# Patient Record
Sex: FEMALE | Race: WHITE | NOT HISPANIC OR LATINO | Employment: FULL TIME | URBAN - METROPOLITAN AREA
[De-identification: names, ages, dates, MRNs, and addresses within clinical notes are randomized per-mention and may not be internally consistent; named-entity substitution may affect disease eponyms.]

---

## 2022-08-12 ENCOUNTER — TELEPHONE (OUTPATIENT)
Dept: NEUROLOGY | Facility: CLINIC | Age: 47
End: 2022-08-12

## 2022-08-12 NOTE — TELEPHONE ENCOUNTER
Called and spoke with the patient to remind of her upcoming appt with Dr Ericka Klein  Provided the address as she was unsure  Patient also stated that she has not seen neurology prior and she has not had imaging done  However her PCP tried to order a CT scan because she can not get a MRI because she has titanium in her back  The CT scan was denied as she would need to see neurology  She is concerned of possible stroke as her mother had a silent stroke which was only found because of imaging that was taken because of headaches and she was in her 45s when this occurred

## 2022-08-26 ENCOUNTER — CONSULT (OUTPATIENT)
Dept: NEUROLOGY | Facility: CLINIC | Age: 47
End: 2022-08-26
Payer: COMMERCIAL

## 2022-08-26 VITALS
HEIGHT: 65 IN | WEIGHT: 128.3 LBS | BODY MASS INDEX: 21.38 KG/M2 | TEMPERATURE: 97.6 F | DIASTOLIC BLOOD PRESSURE: 91 MMHG | HEART RATE: 104 BPM | SYSTOLIC BLOOD PRESSURE: 174 MMHG

## 2022-08-26 DIAGNOSIS — G43.109 OPHTHALMIC MIGRAINE: Primary | ICD-10-CM

## 2022-08-26 DIAGNOSIS — G43.109 OCULAR MIGRAINE: ICD-10-CM

## 2022-08-26 DIAGNOSIS — Z82.3 FAMILY HISTORY OF STROKE: ICD-10-CM

## 2022-08-26 DIAGNOSIS — Z85.820 HISTORY OF MALIGNANT MELANOMA OF SKIN: ICD-10-CM

## 2022-08-26 PROBLEM — M79.18 MYOFASCIAL PAIN: Status: ACTIVE | Noted: 2018-07-10

## 2022-08-26 PROBLEM — M51.26 LUMBAR DISCOGENIC PAIN SYNDROME: Status: ACTIVE | Noted: 2018-07-10

## 2022-08-26 PROBLEM — M47.816 LUMBAR FACET ARTHROPATHY: Status: ACTIVE | Noted: 2018-07-10

## 2022-08-26 PROBLEM — M47.817 LUMBOSACRAL SPONDYLOSIS WITHOUT MYELOPATHY: Status: ACTIVE | Noted: 2018-07-10

## 2022-08-26 PROCEDURE — 99204 OFFICE O/P NEW MOD 45 MIN: CPT | Performed by: PSYCHIATRY & NEUROLOGY

## 2022-08-26 RX ORDER — ACETAMINOPHEN AND CODEINE PHOSPHATE 300; 30 MG/1; MG/1
TABLET ORAL
COMMUNITY

## 2022-08-26 RX ORDER — DEXTROAMPHETAMINE SACCHARATE, AMPHETAMINE ASPARTATE, DEXTROAMPHETAMINE SULFATE AND AMPHETAMINE SULFATE 5; 5; 5; 5 MG/1; MG/1; MG/1; MG/1
20 TABLET ORAL
COMMUNITY

## 2022-08-26 NOTE — PROGRESS NOTES
Patient ID: Bria Connors is a 55 y o  female who presents to the [address] [place] [address]    Assessment/Plan:   Patient Instructions   Ophthalmic migraines:  Maria De Jesus Morrison presents for an initial consultation with regard to episodes of altered vision along with some peripheral visual phenomena potentially consistent with ophthalmic migraines  She does have some light sensitivity and nausea associated with these episodes however she does have a significant difference in refraction from 1 eye to the other and this may in part be related to some changes in vision as opposed to migraine  She does have a family history of stroke in the young which places her at increased risk compared to the general population  She also has a remote history of melanoma of the skin which has been removed     -to more fully rule out any significant intracranial or intra-ocular pathology including inflammation we will request an MRI of the brain and orbits with and without contrast   -if she would like to trial a supplement to try and reduce the frequency and severity of these episodes we would recommend looking for a combination that has 1 or more of the following ingredients (magnesium, riboflavin/ vitamin B2, coenzyme Q10, feverfew)  She should use it according to package instructions and watch for any changes in her GI function  - it is important that she remain well hydrated drinking 48-64 oz of non caffeinated beverages per day in addition to any caffeine     - when she does have episodes and particularly in the evening I would like for her to do a trial of covering each eye for a while to determine if it is really more affecting 1 eye than the other end of that actually resolved her symptoms    I would also like for her to do a trial in the evening of increasing the light in the room during an episode to determine if that makes her feel worse which may be more consistent with migraine or makes her feel better which may be more consistent with a visual focus issue  We will be in touch with her with regard to the results of her MRI soon as they are available  She should contact us in no more than 1 or 2 weeks to let us know how she is doing with regard to the testing we recommended at home and also if she chooses to take the supplement  She will return to the office to see me directly in 4 months time  Diagnoses and all orders for this visit:    Ophthalmic migraine  -     Ambulatory Referral to Neurology  -     MRI brain and orbits wo and w contrast; Future  -     BUN; Future  -     Creatinine, serum; Future  -     BUN  -     Creatinine, serum    Family history of stroke  -     MRI brain and orbits wo and w contrast; Future  -     BUN; Future  -     Creatinine, serum; Future  -     BUN  -     Creatinine, serum    History of malignant melanoma of skin  -     MRI brain and orbits wo and w contrast; Future  -     BUN; Future  -     Creatinine, serum; Future  -     BUN  -     Creatinine, serum    Ocular migraine  -     MRI brain and orbits wo and w contrast; Future  -     BUN; Future  -     Creatinine, serum; Future  -     BUN  -     Creatinine, serum    Other orders  -     acetaminophen-codeine (TYLENOL with CODEINE #3) 300-30 MG per tablet; Take by mouth  -     Fluticasone Propionate (FLONASE NA); into each nostril  -     amphetamine-dextroamphetamine (ADDERALL) 20 mg tablet; Take 20 mg by mouth 2 (two) times a day        Subjective:  I agree with the documentation by Trevor Merino MS3 with my additions/edits and confirmed key aspects of the history  Stacy De Jesus is a 55 y o  female with PMH of melanoma and noninvasive knee tumor presenting with worsening headaches       Headaches first appeared during the last 6 months of her pregnancy in 2011, after which they stopped until recurring last summer   The episodes are characterized by nausea, light sensitivity, and visual disturbance however the past year her headaches have had more predominant visual symptoms compared to those during pregnancy  Episodes occur daily and last a couple of hours with no apparent trigger or aura  She experiences nausea and sees "fireworks" in the periphery and some floaters in her central vision  She also has a difficult time "focusing in" on the image in front of her almost as if the picture is moving forward and backward (towards and away from her)  Advil does not help relieve symptoms, however laying down in a dark room helps  Symptoms worsen with bright lights or reading  Occasionally she experiences what she calls "typical headaches" with a band or tension like feeling across her forehead  The episodes of concern, however, are characterized less by pain and more by difficulty focusing in on what she looks at; she does not have trouble concentrating, it is strictly a visual difficulty  Occasionally she feels a pressure behind her eyes  Symptoms do not have any temporal pattern and are not precipitated by valsalva maneuver        Of note, patient went to her optometrist in August '21 and recieved prescription glasses, with difference in refraction between her eyes  The glasses help her when reading but she notices problems with depth perception when driving  She also noted a 19 pound weight loss since February but attributes this to starting her new job as a  which requires heavy lifting and walking  She stays hydrated during work and denies any N/V, fevers or chills       Ms Rock Villatoro also expressed concern for family history significant for migraines and silent stroke in her mother  She says her mother would get severe migraines and was later found to have had a stroke  She asked if there is any imaging she can receive considering she has titanium hardware in her spine from prior surgery              History reviewed  No pertinent past medical history        Current Outpatient Medications:     acetaminophen-codeine (TYLENOL with CODEINE #3) 300-30 MG per tablet, Take by mouth, Disp: , Rfl:     amphetamine-dextroamphetamine (ADDERALL) 20 mg tablet, Take 20 mg by mouth 2 (two) times a day, Disp: , Rfl:     Fluticasone Propionate (FLONASE NA), into each nostril, Disp: , Rfl:        Objective:    Blood pressure (!) 174/91, pulse 104, temperature 97 6 °F (36 4 °C), temperature source Temporal, height 5' 5" (1 651 m), weight 58 2 kg (128 lb 4 8 oz)  Physical Exam    Neurological Exam    I personally observed and agree with the documentation by Ana Nolan MS3  Neurologic Exam      Mental Status   Attention: normal  Concentration: normal    Speech: speech is normal   Level of consciousness: alert  Knowledge: good       Cranial Nerves   Cranial nerves II through XII intact       CN II   Right visual field deficit: none  Left visual field deficit: none      CN III, IV, VI   Pupils are equal, round, and reactive to light  Extraocular motions are normal    Right pupil: Consensual response: intact  Left pupil: Consensual response: intact       Motor Exam   Muscle bulk: normal  Overall muscle tone: normal  Right arm pronator drift: absent  Left arm pronator drift: absent     Strength   Strength 5/5 throughout       Sensory Exam   Light touch symmetric       Gait, Coordination, and Reflexes      Coordination   Finger to nose coordination: normal     Reflexes   Right brachioradialis: 2+  Left brachioradialis: 2+  Right biceps: 2+  Left biceps: 2+  Right triceps: 2+  Left triceps: 2+  Right patellar: 2+  Left patellar: 2+  Right achilles: 2+  Left achilles: 2+  Right : 2+  Left : 2+    No papilledema on fundoscopy    ROS:    Review of Systems   Constitutional: Negative  Negative for appetite change and fever  HENT: Negative  Negative for hearing loss, tinnitus, trouble swallowing and voice change  Eyes: Negative  Negative for photophobia and pain  Respiratory: Negative  Negative for shortness of breath      Cardiovascular: Negative  Negative for palpitations  Gastrointestinal: Positive for nausea  Negative for vomiting  Endocrine: Negative  Negative for cold intolerance  Genitourinary: Negative  Negative for dysuria, frequency and urgency  Musculoskeletal: Negative  Negative for myalgias and neck pain  Skin: Negative  Negative for rash  Neurological: Positive for dizziness and headaches  Negative for tremors, seizures, syncope, facial asymmetry, speech difficulty, weakness, light-headedness and numbness  Hematological: Negative  Does not bruise/bleed easily  Psychiatric/Behavioral: Negative  Negative for confusion, hallucinations and sleep disturbance  Reviewed ROS as entered by medical assistant

## 2022-08-26 NOTE — PROGRESS NOTES
Tosin Ceballos is a 55 y o  female with PMH of melanoma and noninvasive knee tumor presenting with worsening headaches  Headaches first appeared during the last 6 months of her pregnancy in 2011, after which they stopped until recurring last summer  The episodes are characterized by nausea, light sensitivity, and visual disturbance however the past year her headaches have had more predominant visual symptoms compared to those during pregnancy  Episodes occur daily and last a couple of hours with no apparent trigger or aura  She experiences nausea and sees "fireworks" in the periphery and some floaters in her central vision  She also has a difficult time "focusing in" on the image in front of her almost as if the picture is moving forward and backward (towards and away from her)  Advil does not help relieve symptoms, however laying down in a dark room helps  Symptoms worsen with bright lights or reading  Occasionally she experiences what she calls "typical headaches" with a band or tension like feeling across her forehead  The episodes of concern, however, are characterized less by pain and more by difficulty focusing in on what she looks at; she does not have trouble concentrating, it is strictly a visual difficulty  Occasionally she feels a pressure behind her eyes  Symptoms do not have any temporal pattern and are not precipitated by valsalva maneuver  Of note, patient went to her optometrist in August '21 and recieved prescription glasses, with difference in refraction between her eyes  The glasses help her when reading but she notices problems with depth perception when driving  She also noted a 19 pound weight loss since February but attributes this to starting her new job as a  which requires heavy lifting and walking  She stays hydrated during work and denies any N/V, fevers or chills       Ms Rock Villatoro also expressed concern for family history significant for migraines and silent stroke in her mother  She says her mother would get severe migraines and was later found to have had a stroke  She asked if there is any imaging she can receive considering she has titanium hardware in her spine from prior surgery  Neurologic Exam     Mental Status   Attention: normal  Concentration: normal    Speech: speech is normal   Level of consciousness: alert  Knowledge: good  Cranial Nerves   Cranial nerves II through XII intact  CN II   Right visual field deficit: none  Left visual field deficit: none     CN III, IV, VI   Pupils are equal, round, and reactive to light  Extraocular motions are normal    Right pupil: Consensual response: intact  Left pupil: Consensual response: intact  Motor Exam   Muscle bulk: normal  Overall muscle tone: normal  Right arm pronator drift: absent  Left arm pronator drift: absent    Strength   Strength 5/5 throughout       Sensory Exam   Light touch symmetric      Gait, Coordination, and Reflexes     Coordination   Finger to nose coordination: normal    Reflexes   Right brachioradialis: 2+  Left brachioradialis: 2+  Right biceps: 2+  Left biceps: 2+  Right triceps: 2+  Left triceps: 2+  Right patellar: 2+  Left patellar: 2+  Right achilles: 2+  Left achilles: 2+  Right : 2+  Left : 2+

## 2022-08-26 NOTE — PATIENT INSTRUCTIONS
Ophthalmic migraines:  Devika Gery presents for an initial consultation with regard to episodes of altered vision along with some peripheral visual phenomena potentially consistent with ophthalmic migraines  She does have some light sensitivity and nausea associated with these episodes however she does have a significant difference in refraction from 1 eye to the other and this may in part be related to some changes in vision as opposed to migraine  She does have a family history of stroke in the young which places her at increased risk compared to the general population  She also has a remote history of melanoma of the skin which has been removed     -to more fully rule out any significant intracranial or intra-ocular pathology including inflammation we will request an MRI of the brain and orbits with and without contrast   -if she would like to trial a supplement to try and reduce the frequency and severity of these episodes we would recommend looking for a combination that has 1 or more of the following ingredients (magnesium, riboflavin/ vitamin B2, coenzyme Q10, feverfew)  She should use it according to package instructions and watch for any changes in her GI function  - it is important that she remain well hydrated drinking 48-64 oz of non caffeinated beverages per day in addition to any caffeine     - when she does have episodes and particularly in the evening I would like for her to do a trial of covering each eye for a while to determine if it is really more affecting 1 eye than the other end of that actually resolved her symptoms  I would also like for her to do a trial in the evening of increasing the light in the room during an episode to determine if that makes her feel worse which may be more consistent with migraine or makes her feel better which may be more consistent with a visual focus issue  We will be in touch with her with regard to the results of her MRI soon as they are available    She should contact us in no more than 1 or 2 weeks to let us know how she is doing with regard to the testing we recommended at home and also if she chooses to take the supplement  She will return to the office to see me directly in 4 months time

## 2022-09-16 LAB
BUN SERPL-MCNC: 13 MG/DL (ref 6–24)
CREAT SERPL-MCNC: 0.59 MG/DL (ref 0.57–1)
EGFR: 112 ML/MIN/1.73

## 2022-09-19 ENCOUNTER — HOSPITAL ENCOUNTER (OUTPATIENT)
Dept: MRI IMAGING | Facility: HOSPITAL | Age: 47
Discharge: HOME | End: 2022-09-19
Attending: PSYCHIATRY & NEUROLOGY
Payer: COMMERCIAL

## 2022-09-19 DIAGNOSIS — G43.109 OCULAR MIGRAINE: ICD-10-CM

## 2022-09-19 DIAGNOSIS — G43.109 OPHTHALMIC MIGRAINE: Primary | ICD-10-CM

## 2022-09-19 DIAGNOSIS — Z82.3 FAMILY HISTORY OF STROKE: ICD-10-CM

## 2022-09-19 PROCEDURE — A9585 GADOBUTROL INJECTION: HCPCS | Performed by: PHYSICIAN ASSISTANT

## 2022-09-19 PROCEDURE — 70553 MRI BRAIN STEM W/O & W/DYE: CPT

## 2022-09-19 PROCEDURE — G1004 CDSM NDSC: HCPCS

## 2022-09-19 RX ADMIN — GADOBUTROL 5.5 ML: 604.72 INJECTION INTRAVENOUS at 21:23

## 2022-09-22 ENCOUNTER — TELEPHONE (OUTPATIENT)
Dept: NEUROLOGY | Facility: CLINIC | Age: 47
End: 2022-09-22

## 2022-09-22 NOTE — TELEPHONE ENCOUNTER
Brain MRI w/wo contrast 9/19/22 is unremarkable for abnormalities explaining her neurological symptoms  Per mri report, "no acute infarction, intracranial hemorrhage or mass "    Orbits normal     Clinical please let pt know and will CC Dr Gilberto Muller for review

## 2022-09-23 NOTE — TELEPHONE ENCOUNTER
Called patient, reviewed MRI results   Patient verbalized understanding and denies further questions at this time

## 2022-12-22 ENCOUNTER — TELEPHONE (OUTPATIENT)
Dept: NEUROLOGY | Facility: CLINIC | Age: 47
End: 2022-12-22

## 2022-12-22 NOTE — TELEPHONE ENCOUNTER
Called and left a voicemail for patient - Please call back to confirm upcoming appointment with [facility]  Provided patient with apt date, time and location  Informed patient that check in is at least 15 minutes prior to apt time

## 2023-01-20 ENCOUNTER — TELEPHONE (OUTPATIENT)
Dept: NEUROLOGY | Facility: CLINIC | Age: 48
End: 2023-01-20

## 2023-01-20 NOTE — TELEPHONE ENCOUNTER
Called and spoke to patient - confirmed upcoming appointment with Akira Ramirez on 01/31/23 3:00 pm at the [facility] office  Provided patient with apt date, time and location  Informed patient that check in is at least 15 minutes prior to apt time  The patient is not  having any issues or concerns at this time

## 2023-01-31 ENCOUNTER — OFFICE VISIT (OUTPATIENT)
Dept: NEUROLOGY | Facility: CLINIC | Age: 48
End: 2023-01-31

## 2023-01-31 VITALS
DIASTOLIC BLOOD PRESSURE: 77 MMHG | HEART RATE: 87 BPM | HEIGHT: 65 IN | RESPIRATION RATE: 20 BRPM | WEIGHT: 127 LBS | BODY MASS INDEX: 21.16 KG/M2 | SYSTOLIC BLOOD PRESSURE: 156 MMHG | TEMPERATURE: 97.2 F

## 2023-01-31 DIAGNOSIS — G43.109 MIGRAINE WITH AURA AND WITHOUT STATUS MIGRAINOSUS, NOT INTRACTABLE: Primary | ICD-10-CM

## 2023-01-31 RX ORDER — VALACYCLOVIR HYDROCHLORIDE 1 G/1
2000 TABLET, FILM COATED ORAL AS NEEDED
COMMUNITY
Start: 2023-01-25

## 2023-01-31 RX ORDER — LANOLIN ALCOHOL/MO/W.PET/CERES
1 CREAM (GRAM) TOPICAL AS NEEDED
COMMUNITY

## 2023-01-31 RX ORDER — METHOCARBAMOL 750 MG/1
TABLET ORAL
COMMUNITY
Start: 2023-01-09

## 2023-01-31 RX ORDER — ACYCLOVIR 50 MG/G
1 OINTMENT TOPICAL 2 TIMES DAILY PRN
COMMUNITY
Start: 2023-01-25

## 2023-01-31 RX ORDER — AMLODIPINE BESYLATE 5 MG/1
TABLET ORAL
COMMUNITY
Start: 2022-12-14

## 2023-01-31 RX ORDER — METOPROLOL SUCCINATE 100 MG/1
TABLET, EXTENDED RELEASE ORAL
COMMUNITY
Start: 2022-11-11

## 2023-01-31 NOTE — PATIENT INSTRUCTIONS
MRI brain is normal  Let us know if any changes in vision; make sure to wear correction regularly and keep eye drops nearby  Continue monitoring blood pressure  Magnesium oxide 400mg once day  Riboflavin (b2) 400mg per day or:  -combo pill is either migravent or migrelief   Try the nurtec you were given- let me know if this helps  Think about switching the dose of adderall like we spoke about to see if this makes any change  Follow up in 4 months time

## 2023-01-31 NOTE — PROGRESS NOTES
Patient ID: Cristino Carreno is a 52 y o  female  Assessment/Plan:  Patient Instructions:  MRI brain is normal  Let us know if any changes in vision; make sure to wear correction regularly and keep eye drops nearby  Continue monitoring blood pressure  Magnesium oxide 400mg once day  Riboflavin (b2) 400mg per day or:  -combo pill is either migravent or migrelief   Try the nurtec you were given- let me know if this helps  Think about switching the dose of adderall like we spoke about to see if this makes any change  Follow up in 4 months time       Diagnoses and all orders for this visit:    Migraine with aura and without status migrainosus, not intractable        Subjective:  Trupti Lamb is a 52year old female with past medical history of migraine, melanoma, hypertension, adhd on adderall, back pain who presents for follow up; initial consultation 8/26/2022  Since that time she had a repeat eye exam in October with no significant findings (at [facility]) and is on eye drops for dry eye  Today she denies significant changes in vision  She does mention she had an adderall dose increase but she does not think this made her headaches worse  She has not yet started vitamin therapies for migraine prevention  She states she was given samples of nurtec to try but has not tried this yet  No current chance of pregnancy  She states she has been hydrating well with water  MRI brain/orbits 9/19/2022:  IMPRESSION:  No acute infarction, intracranial hemorrhage or mass  HPI   Since your last visit are your headaches Remained the Same  Are you taking your current medications as prescribed? no  If no, why not? Did not have any  How often do you use abortive medications to treat a headache? 0 times per day  How effective are they? unable to assess effectiveness  From 0-10, how severe is the pain for a typical headache for you?  4  What does your typical headache pain feel like? pressure  Where is your typical "headache? apical and frontal  What is your current headache frequency: several times per day  How long does your typical headache last? Hours hour(s) (sometimes can last a day)  In addition to the head pain, what other symptoms do you have before or during your headaches? blurred vision, light sensitivity and nausea  Do you have anything you need to discuss with the doctor during your visit? No    Previous history:  Headaches first appeared during the last 6 months of her pregnancy in 2011, after which they stopped until recurring last summer  The episodes are characterized by nausea, light sensitivity, and visual disturbance however the past year her headaches have had more predominant visual symptoms compared to those during pregnancy  Episodes occur daily and last a couple of hours with no apparent trigger or aura  She experiences nausea and sees \"fireworks\" in the periphery and some floaters in her central vision  She also has a difficult time \"focusing in\" on the image in front of her almost as if the picture is moving forward and backward (towards and away from her)  Advil does not help relieve symptoms, however laying down in a dark room helps  Symptoms worsen with bright lights or reading  Symptoms do not have any temporal pattern and are not precipitated by valsalva maneuver     Ms Tiny Bro also expressed concern for family history significant for migraines and silent stroke in her mother  She says her mother would get severe migraines and was later found to have had a stroke  The following portions of the patient's history were reviewed and updated as appropriate: allergies, current medications, past family history, past medical history, past social history, past surgical history and problem list          Objective:    Blood pressure 156/77, pulse 87, temperature (!) 97 2 °F (36 2 °C), temperature source Temporal, resp  rate 20, height 5' 5\" (1 651 m), weight 57 6 kg (127 lb)      Physical Exam  Vitals " reviewed  Constitutional:       General: She is not in acute distress  Appearance: She is not ill-appearing, toxic-appearing or diaphoretic  HENT:      Head: Normocephalic and atraumatic  Right Ear: External ear normal       Left Ear: External ear normal       Nose: Nose normal       Mouth/Throat:      Mouth: Mucous membranes are moist       Pharynx: Oropharynx is clear  Eyes:      General: Lids are normal       Extraocular Movements: Extraocular movements intact  Pupils: Pupils are equal, round, and reactive to light  Cardiovascular:      Rate and Rhythm: Normal rate and regular rhythm  Pulses: Normal pulses  Pulmonary:      Effort: Pulmonary effort is normal    Abdominal:      General: There is no distension  Musculoskeletal:      Cervical back: Normal range of motion  No tenderness  Right lower leg: No edema  Left lower leg: No edema  Skin:     General: Skin is warm  Capillary Refill: Capillary refill takes less than 2 seconds  Neurological:      General: No focal deficit present  Mental Status: She is alert  Motor: Motor strength is normal       Coordination: Romberg sign negative  Deep Tendon Reflexes:      Reflex Scores:       Brachioradialis reflexes are 2+ on the right side and 2+ on the left side  Patellar reflexes are 2+ on the right side and 2+ on the left side  Psychiatric:         Mood and Affect: Mood normal          Speech: Speech normal          Behavior: Behavior normal          Neurological Exam  Mental Status  Alert  Oriented to person, place and time  Speech is normal  Language is fluent with no aphasia  Cranial Nerves  CN II: Visual acuity is normal  Visual fields full to confrontation  CN III, IV, VI: Extraocular movements intact bilaterally  Normal lids and orbits bilaterally  Pupils equal round and reactive to light bilaterally  CN V: Facial sensation is normal   CN VII: Full and symmetric facial movement    CN VIII: Hearing is normal   CN IX, X: Palate elevates symmetrically  Normal gag reflex  CN XI: Shoulder shrug strength is normal   CN XII: Tongue midline without atrophy or fasciculations  Motor  Normal muscle bulk throughout  Strength is 5/5 throughout all four extremities  Sensory  Light touch is normal in upper and lower extremities  Reflexes                                            Right                      Left  Brachioradialis                    2+                         2+  Patellar                                2+                         2+    Coordination  Right: Finger-to-nose normal Left: Finger-to-nose normal     Gait  Casual gait is normal including stance, stride, and arm swing  Romberg is absent  Able to rise from chair without using arms  ROS:    Review of Systems   Constitutional: Negative  Negative for appetite change and fever  HENT: Negative  Negative for hearing loss, tinnitus, trouble swallowing and voice change  Eyes: Negative  Negative for photophobia, pain and visual disturbance  Respiratory: Negative  Negative for shortness of breath  Cardiovascular: Negative  Negative for palpitations  Gastrointestinal: Negative  Negative for nausea and vomiting  Endocrine: Negative  Negative for cold intolerance  Genitourinary: Negative  Negative for dysuria, frequency and urgency  Musculoskeletal: Negative  Negative for gait problem, myalgias and neck pain  Skin: Negative  Negative for rash  Allergic/Immunologic: Negative  Neurological: Negative  Negative for dizziness, tremors, seizures, syncope, facial asymmetry, speech difficulty, weakness, light-headedness, numbness and headaches  Hematological: Negative  Does not bruise/bleed easily  Psychiatric/Behavioral: Negative  Negative for confusion, hallucinations and sleep disturbance     ROS was reviewed and updated as appropriate

## 2023-05-25 ENCOUNTER — TELEPHONE (OUTPATIENT)
Dept: NEUROLOGY | Facility: CLINIC | Age: 48
End: 2023-05-25

## 2023-05-25 NOTE — TELEPHONE ENCOUNTER
Called and left a voicemail for patient - Please call back to confirm upcoming appointment with [facility]  Provided patient with apt date, time and location  Informed patient that check in is at least 15 minutes prior to apt time